# Patient Record
(demographics unavailable — no encounter records)

---

## 2025-06-25 NOTE — PHYSICAL EXAM
[FreeTextEntry1] : NAVID is a 42 year old male Constitutional: healthy appearing, NAD, and normal body habitus   LUMBAR ROM: flexion to   Gait: normal   Inspection: no erythema, warmth Spine: no TTP in spinous process Bony palpation: no TTP in GT   Soft tissue palpation hip: no TTP in gluteus ha Soft tissue palpation of spine: no TTP in lumbar paraspinals   5/5 bilateral KE, DF, PF sensation intact in bilat LE reflexes: knee                ankle 1+ on right, 2+ on left   Special tests: neg seated SLR

## 2025-06-25 NOTE — HISTORY OF PRESENT ILLNESS
[FreeTextEntry1] : Location: back slightly left Severity: moderate, varies, not able to run Duration: over 10 yr Context: has mild knee OA which bothers him Aggravating Factors: running, stairs Alleviating Factors: rest, ice, advil Associated Symptoms: denies weight loss, fever, chills, change in bowel/bladder habits, weakness, numbness/tingling, radiation down leg Prior Studies: MRI

## 2025-06-25 NOTE — PROCEDURE
[de-identified] : Indication: myofascial pain   After informed consent, he elected to proceed with a trigger point injection into the left L5 paraspinals. I confirmed no prior adverse reactions, no active infections, and no relevant allergies.   The skin was prepped in the usual sterile manner.  The sites were injected with Lidocaine followed by local needling. The injection was completed without complication and a bandage was applied. He tolerated the procedure well and was given post-injection instructions.   Cold Tx x 48 hours, analgesics prn. Medications: 0.5 ml of 1% Lidocaine per site   Exp 3/2026 Manufacture: [id number] LOT [id number]

## 2025-06-25 NOTE — ASSESSMENT
[FreeTextEntry1] : MRI shows L4/5 listhesis with left foraminal HNP L5/S1 disc bulge and fissure. xray knee shows mild OA  Discussed diagnosis and treatment plan including PT. Saw Rheum already  reviewed exercises to do needs to land softly in order to run.  No running until he has good form and knees are better getting gel injections from Dr Christianson soon.  Had PRP before. ice area often Avoid back flexion.  Limit sitting.  Ice back often.  Lift things properly.  Massage back with tennis ball.  take ibuprofen prn  f/u 3-4 wk